# Patient Record
(demographics unavailable — no encounter records)

---

## 2025-05-08 NOTE — HISTORY OF PRESENT ILLNESS
[FreeTextEntry1] : Satish is a 15 year old male presenting to the office today with his mother for initial pediatric orthopedic evaluation of left knee pain. Patient reports he has been experiencing left knee pain for approximately 2 months. There was no known injury of trauma. He reports the pain started about the back of the knee and now has localized to the front. He also reports he has been experiencing knee clicking, but has since resolved. His pain is currently a 2/10. It is associated with sitting with his knee bent for long periods of time. He has continued to participate in his typical activities such as varsity golf without restriction from his discomfort. Denies swelling or bruising. Denies numbness, tingling, radiating pain, or weakness throughout the left lower extremity.   The patient's HPI was reviewed thoroughly with patient and parent. The patient's parent has acted as an independent historian regarding the above information due to the unreliable nature of the history obtained from the patient.

## 2025-05-08 NOTE — REVIEW OF SYSTEMS
[Joint Pains] : arthralgias [Change in Activity] : no change in activity [Fever Above 102] : no fever [Malaise] : no malaise [Rash] : no rash [Itching] : no itching [Eye Pain] : no eye pain [Redness] : no redness [Nasal Stuffiness] : no nasal congestion [Sore Throat] : no sore throat [Heart Problems] : no heart problems [Tachypnea] : no tachypnea [Wheezing] : no wheezing [Vomiting] : no vomiting [Limping] : no limping [Joint Swelling] : no joint swelling

## 2025-05-08 NOTE — ASSESSMENT
[FreeTextEntry1] : Satish is a 15 year old male with left knee pain, and pes planus.   Today's assessment was performed with the assistance of the patient's parent as an independent historian given the patient's age, who could not be considered a reliable history/due to the nonverbal nature given the patient's young age. Clinical findings and x-ray results were reviewed at length with the patient and parent. The condition, natural history, and prognosis were explained to the patient and family. The recommendation at this time is to begin a course of physical therapy to target ROM, stretching, and quadricep muscle strengthening. Prescription provided at today's office visit. He can continue to participate in activities as tolerated. In regards to his flat feet, the importance of wearing arch support inside and out of the house was discussed. Over the counter arch support discussed. He will follow up in the office in approximately 6 weeks after completing a course of physical therapy for reevaluation.   All questions and concerns were addressed today. Parent and patient verbalize understanding and agree with plan of care.  I, Sonya Mcmahan PA-C, have acted as a scribe and documented the above information for Dr. Andrade.

## 2025-05-08 NOTE — END OF VISIT
[FreeTextEntry3] :     Saw and examined patient; the above is an accurate documentation of my words and actions.   Missy Andrade MD [facility] Pediatric Orthopedic Surgery

## 2025-05-08 NOTE — END OF VISIT
[FreeTextEntry3] :     Saw and examined patient; the above is an accurate documentation of my words and actions.   Missy Andrade MD [facility] Pediatric Orthopedic Surgery

## 2025-05-08 NOTE — PHYSICAL EXAM
[FreeTextEntry1] : GENERAL: alert, cooperative, in NAD SKIN: The skin is intact, warm, pink and dry over the area examined. EYES: Normal conjunctiva, normal eyelids and pupils were equal and round. ENT: normal ears, normal nose and normal lips. CARDIOVASCULAR: brisk capillary refill, but no peripheral edema. RESPIRATORY: The patient is in no apparent respiratory distress. They're taking full deep breaths without use of accessory muscles or evidence of audible wheezes or stridor without the use of a stethoscope. Normal respiratory effort. ABDOMEN: not examined  Left Knee: No bony deformities, signs of trauma, or erythema noted. No visible effusion, muscle atrophy or asymmetry. (+) medial and lateral joint line tenderness. Full active and passive range of motion of the knee. Pain with hyperflexion. Toes are warm, pink, and moving freely. Strength is 5/5. Sensation is intact to light touch distally. Patellar reflex +2 B/L. Brisk capillary refill in all toes. No joint laxity palpable. Joint is stable with varus and valgus stress. Negative Lachmann test, negative anterior and posterior drawer with solid end point. Negative [facility] test. Negative patellar grind and patellar apprehension test. No abnormal findings on ankle or hip examination.  Bilateral Feet: Patient has bilateral mild arch collapse with standing. The arches reform when sitting and on toe dorsiflexion. Subtalar motion is full and free. There is no heel cord tightness. Skin is clean and intact. No swelling, erythema, or ecchymosis. No lymphedema. Grossly non tender to palpation over LE Full ROM bilateral hips/knees/ankles. SILT, 5/5 strength EHL/FHL/ TA/GS DP 2+, Brisk cap refill <2 seconds

## 2025-05-08 NOTE — DATA REVIEWED
[de-identified] : XRs of the Left Knee (3 views) were obtained and independently reviewed at today's office visit on 5/5/25: No acute fracture or dislocation. Joint spaces are maintained.

## 2025-05-08 NOTE — DATA REVIEWED
[de-identified] : XRs of the Left Knee (3 views) were obtained and independently reviewed at today's office visit on 5/5/25: No acute fracture or dislocation. Joint spaces are maintained.

## 2025-05-08 NOTE — REASON FOR VISIT
[Initial Evaluation] : an initial evaluation [Patient] : patient [Mother] : mother [FreeTextEntry1] : left knee injury